# Patient Record
Sex: FEMALE | Race: WHITE | Employment: FULL TIME | URBAN - METROPOLITAN AREA
[De-identification: names, ages, dates, MRNs, and addresses within clinical notes are randomized per-mention and may not be internally consistent; named-entity substitution may affect disease eponyms.]

---

## 2017-03-29 PROBLEM — Z85.828 PERSONAL HISTORY OF SQUAMOUS CELL CARCINOMA OF SKIN: Status: ACTIVE | Noted: 2017-03-29

## 2020-07-13 PROBLEM — R51.9 RIGHT-SIDED HEADACHE: Status: ACTIVE | Noted: 2020-07-13

## 2020-08-04 PROBLEM — L57.0 ACTINIC KERATOSES: Status: ACTIVE | Noted: 2020-08-04

## 2021-06-12 ENCOUNTER — LAB ENCOUNTER (OUTPATIENT)
Dept: LAB | Facility: HOSPITAL | Age: 51
End: 2021-06-12
Attending: INTERNAL MEDICINE
Payer: COMMERCIAL

## 2021-06-12 DIAGNOSIS — Z01.818 PRE-OP TESTING: ICD-10-CM

## 2021-06-15 PROBLEM — Z12.11 SPECIAL SCREENING FOR MALIGNANT NEOPLASM OF COLON: Status: ACTIVE | Noted: 2021-06-15

## 2021-06-15 PROBLEM — D12.4 BENIGN NEOPLASM OF DESCENDING COLON: Status: ACTIVE | Noted: 2021-06-15

## 2021-06-15 PROBLEM — R10.13 ABDOMINAL PAIN, EPIGASTRIC: Status: ACTIVE | Noted: 2021-06-15

## 2021-06-15 PROBLEM — D12.0 BENIGN NEOPLASM OF CECUM: Status: ACTIVE | Noted: 2021-06-15

## 2021-06-15 PROBLEM — D12.2 BENIGN NEOPLASM OF ASCENDING COLON: Status: ACTIVE | Noted: 2021-06-15

## 2021-06-15 PROBLEM — D12.5 BENIGN NEOPLASM OF SIGMOID COLON: Status: ACTIVE | Noted: 2021-06-15

## 2021-10-26 PROBLEM — D22.5 BECKER'S NEVUS: Status: ACTIVE | Noted: 2021-10-26

## 2022-03-16 PROBLEM — N95.1 MENOPAUSAL VAGINAL DRYNESS: Status: ACTIVE | Noted: 2022-03-16

## 2022-06-01 ENCOUNTER — ORDER TRANSCRIPTION (OUTPATIENT)
Dept: ADMINISTRATIVE | Facility: HOSPITAL | Age: 52
End: 2022-06-01

## 2022-06-01 DIAGNOSIS — Z13.6 ENCOUNTER FOR SCREENING FOR CARDIOVASCULAR DISORDERS: Primary | ICD-10-CM

## 2022-06-06 ENCOUNTER — HOSPITAL ENCOUNTER (OUTPATIENT)
Dept: CT IMAGING | Facility: HOSPITAL | Age: 52
Discharge: HOME | End: 2022-06-06
Attending: FAMILY MEDICINE

## 2022-06-06 DIAGNOSIS — Z13.6 ENCOUNTER FOR SCREENING FOR CARDIOVASCULAR DISORDERS: ICD-10-CM

## 2024-04-15 ENCOUNTER — ANESTHESIA (OUTPATIENT)
Dept: SURGERY | Facility: HOSPITAL | Age: 54
End: 2024-04-15
Payer: COMMERCIAL

## 2024-04-15 ENCOUNTER — HOSPITAL ENCOUNTER (OUTPATIENT)
Facility: HOSPITAL | Age: 54
Setting detail: OBSERVATION
Discharge: HOME | End: 2024-04-15
Attending: SURGERY | Admitting: SURGERY
Payer: COMMERCIAL

## 2024-04-15 ENCOUNTER — ANESTHESIA EVENT (OUTPATIENT)
Dept: SURGERY | Facility: HOSPITAL | Age: 54
End: 2024-04-15
Payer: COMMERCIAL

## 2024-04-15 VITALS
DIASTOLIC BLOOD PRESSURE: 71 MMHG | OXYGEN SATURATION: 98 % | HEART RATE: 79 BPM | SYSTOLIC BLOOD PRESSURE: 122 MMHG | TEMPERATURE: 99 F | WEIGHT: 139.81 LBS | BODY MASS INDEX: 23.29 KG/M2 | HEIGHT: 65 IN | RESPIRATION RATE: 16 BRPM

## 2024-04-15 DIAGNOSIS — K35.80 APPENDICITIS, ACUTE: Primary | ICD-10-CM

## 2024-04-15 DIAGNOSIS — K35.80 APPENDICITIS, ACUTE: ICD-10-CM

## 2024-04-15 PROCEDURE — 0DTJ4ZZ RESECTION OF APPENDIX, PERCUTANEOUS ENDOSCOPIC APPROACH: ICD-10-PCS | Performed by: SURGERY

## 2024-04-15 PROCEDURE — 88304 TISSUE EXAM BY PATHOLOGIST: CPT | Performed by: SURGERY

## 2024-04-15 RX ORDER — KETOROLAC TROMETHAMINE 30 MG/ML
INJECTION, SOLUTION INTRAMUSCULAR; INTRAVENOUS AS NEEDED
Status: DISCONTINUED | OUTPATIENT
Start: 2024-04-15 | End: 2024-04-15 | Stop reason: SURG

## 2024-04-15 RX ORDER — ONDANSETRON 2 MG/ML
INJECTION INTRAMUSCULAR; INTRAVENOUS AS NEEDED
Status: DISCONTINUED | OUTPATIENT
Start: 2024-04-15 | End: 2024-04-15 | Stop reason: SURG

## 2024-04-15 RX ORDER — SODIUM CHLORIDE, SODIUM LACTATE, POTASSIUM CHLORIDE, CALCIUM CHLORIDE 600; 310; 30; 20 MG/100ML; MG/100ML; MG/100ML; MG/100ML
INJECTION, SOLUTION INTRAVENOUS CONTINUOUS
Status: DISCONTINUED | OUTPATIENT
Start: 2024-04-15 | End: 2024-04-15

## 2024-04-15 RX ORDER — ONDANSETRON 2 MG/ML
4 INJECTION INTRAMUSCULAR; INTRAVENOUS EVERY 6 HOURS PRN
Status: DISCONTINUED | OUTPATIENT
Start: 2024-04-15 | End: 2024-04-15

## 2024-04-15 RX ORDER — HYDROCODONE BITARTRATE AND ACETAMINOPHEN 5; 325 MG/1; MG/1
1 TABLET ORAL ONCE AS NEEDED
Status: COMPLETED | OUTPATIENT
Start: 2024-04-15 | End: 2024-04-15

## 2024-04-15 RX ORDER — METRONIDAZOLE 500 MG/100ML
INJECTION, SOLUTION INTRAVENOUS AS NEEDED
Status: DISCONTINUED | OUTPATIENT
Start: 2024-04-15 | End: 2024-04-15 | Stop reason: SURG

## 2024-04-15 RX ORDER — LEVOFLOXACIN 750 MG/1
750 TABLET, FILM COATED ORAL DAILY
Qty: 10 TABLET | Refills: 0 | Status: SHIPPED | OUTPATIENT
Start: 2024-04-15 | End: 2024-04-25

## 2024-04-15 RX ORDER — ACETAMINOPHEN 500 MG
1000 TABLET ORAL ONCE AS NEEDED
Status: COMPLETED | OUTPATIENT
Start: 2024-04-15 | End: 2024-04-15

## 2024-04-15 RX ORDER — ACETAMINOPHEN 500 MG
1000 TABLET ORAL ONCE
Status: DISCONTINUED | OUTPATIENT
Start: 2024-04-15 | End: 2024-04-15 | Stop reason: HOSPADM

## 2024-04-15 RX ORDER — DEXAMETHASONE SODIUM PHOSPHATE 4 MG/ML
VIAL (ML) INJECTION AS NEEDED
Status: DISCONTINUED | OUTPATIENT
Start: 2024-04-15 | End: 2024-04-15 | Stop reason: SURG

## 2024-04-15 RX ORDER — OXYCODONE HYDROCHLORIDE AND ACETAMINOPHEN 5; 325 MG/1; MG/1
1 TABLET ORAL EVERY 4 HOURS PRN
Qty: 30 TABLET | Refills: 0 | Status: SHIPPED | OUTPATIENT
Start: 2024-04-15 | End: 2024-04-25

## 2024-04-15 RX ORDER — HYDROMORPHONE HYDROCHLORIDE 1 MG/ML
0.2 INJECTION, SOLUTION INTRAMUSCULAR; INTRAVENOUS; SUBCUTANEOUS EVERY 5 MIN PRN
Status: DISCONTINUED | OUTPATIENT
Start: 2024-04-15 | End: 2024-04-15

## 2024-04-15 RX ORDER — HYDROMORPHONE HYDROCHLORIDE 1 MG/ML
0.4 INJECTION, SOLUTION INTRAMUSCULAR; INTRAVENOUS; SUBCUTANEOUS EVERY 5 MIN PRN
Status: DISCONTINUED | OUTPATIENT
Start: 2024-04-15 | End: 2024-04-15

## 2024-04-15 RX ORDER — ROCURONIUM BROMIDE 10 MG/ML
INJECTION, SOLUTION INTRAVENOUS AS NEEDED
Status: DISCONTINUED | OUTPATIENT
Start: 2024-04-15 | End: 2024-04-15 | Stop reason: SURG

## 2024-04-15 RX ORDER — LABETALOL HYDROCHLORIDE 5 MG/ML
5 INJECTION, SOLUTION INTRAVENOUS EVERY 5 MIN PRN
Status: DISCONTINUED | OUTPATIENT
Start: 2024-04-15 | End: 2024-04-15

## 2024-04-15 RX ORDER — SCOLOPAMINE TRANSDERMAL SYSTEM 1 MG/1
1 PATCH, EXTENDED RELEASE TRANSDERMAL ONCE
Status: DISCONTINUED | OUTPATIENT
Start: 2024-04-15 | End: 2024-04-15

## 2024-04-15 RX ORDER — CEFAZOLIN SODIUM/WATER 2 G/20 ML
2 SYRINGE (ML) INTRAVENOUS ONCE
Status: COMPLETED | OUTPATIENT
Start: 2024-04-15 | End: 2024-04-15

## 2024-04-15 RX ORDER — LIDOCAINE HYDROCHLORIDE 10 MG/ML
INJECTION, SOLUTION EPIDURAL; INFILTRATION; INTRACAUDAL; PERINEURAL AS NEEDED
Status: DISCONTINUED | OUTPATIENT
Start: 2024-04-15 | End: 2024-04-15 | Stop reason: SURG

## 2024-04-15 RX ORDER — CEFAZOLIN SODIUM/WATER 2 G/20 ML
SYRINGE (ML) INTRAVENOUS
Status: DISCONTINUED
Start: 2024-04-15 | End: 2024-04-15

## 2024-04-15 RX ORDER — ASCORBIC ACID 500 MG
500 TABLET ORAL DAILY
COMMUNITY

## 2024-04-15 RX ORDER — BUPIVACAINE HYDROCHLORIDE AND EPINEPHRINE 5; 5 MG/ML; UG/ML
INJECTION, SOLUTION EPIDURAL; INTRACAUDAL; PERINEURAL AS NEEDED
Status: DISCONTINUED | OUTPATIENT
Start: 2024-04-15 | End: 2024-04-15 | Stop reason: HOSPADM

## 2024-04-15 RX ORDER — PROCHLORPERAZINE EDISYLATE 5 MG/ML
5 INJECTION INTRAMUSCULAR; INTRAVENOUS EVERY 8 HOURS PRN
Status: DISCONTINUED | OUTPATIENT
Start: 2024-04-15 | End: 2024-04-15

## 2024-04-15 RX ORDER — HYDROCODONE BITARTRATE AND ACETAMINOPHEN 5; 325 MG/1; MG/1
2 TABLET ORAL ONCE AS NEEDED
Status: COMPLETED | OUTPATIENT
Start: 2024-04-15 | End: 2024-04-15

## 2024-04-15 RX ORDER — NALOXONE HYDROCHLORIDE 0.4 MG/ML
80 INJECTION, SOLUTION INTRAMUSCULAR; INTRAVENOUS; SUBCUTANEOUS AS NEEDED
Status: DISCONTINUED | OUTPATIENT
Start: 2024-04-15 | End: 2024-04-15

## 2024-04-15 RX ORDER — EPHEDRINE SULFATE 50 MG/ML
INJECTION INTRAVENOUS AS NEEDED
Status: DISCONTINUED | OUTPATIENT
Start: 2024-04-15 | End: 2024-04-15 | Stop reason: SURG

## 2024-04-15 RX ORDER — HYDROMORPHONE HYDROCHLORIDE 1 MG/ML
0.6 INJECTION, SOLUTION INTRAMUSCULAR; INTRAVENOUS; SUBCUTANEOUS EVERY 5 MIN PRN
Status: DISCONTINUED | OUTPATIENT
Start: 2024-04-15 | End: 2024-04-15

## 2024-04-15 RX ADMIN — LIDOCAINE HYDROCHLORIDE 50 MG: 10 INJECTION, SOLUTION EPIDURAL; INFILTRATION; INTRACAUDAL; PERINEURAL at 19:13:00

## 2024-04-15 RX ADMIN — EPHEDRINE SULFATE 10 MG: 50 INJECTION INTRAVENOUS at 19:30:00

## 2024-04-15 RX ADMIN — CEFAZOLIN SODIUM/WATER 2 G: 2 G/20 ML SYRINGE (ML) INTRAVENOUS at 19:16:00

## 2024-04-15 RX ADMIN — ROCURONIUM BROMIDE 50 MG: 10 INJECTION, SOLUTION INTRAVENOUS at 19:13:00

## 2024-04-15 RX ADMIN — ONDANSETRON 4 MG: 2 INJECTION INTRAMUSCULAR; INTRAVENOUS at 19:45:00

## 2024-04-15 RX ADMIN — DEXAMETHASONE SODIUM PHOSPHATE 8 MG: 4 MG/ML VIAL (ML) INJECTION at 19:20:00

## 2024-04-15 RX ADMIN — SODIUM CHLORIDE, SODIUM LACTATE, POTASSIUM CHLORIDE, CALCIUM CHLORIDE: 600; 310; 30; 20 INJECTION, SOLUTION INTRAVENOUS at 19:08:00

## 2024-04-15 RX ADMIN — SODIUM CHLORIDE, SODIUM LACTATE, POTASSIUM CHLORIDE, CALCIUM CHLORIDE: 600; 310; 30; 20 INJECTION, SOLUTION INTRAVENOUS at 19:41:00

## 2024-04-15 RX ADMIN — METRONIDAZOLE 500 MG: 500 INJECTION, SOLUTION INTRAVENOUS at 19:16:00

## 2024-04-15 RX ADMIN — KETOROLAC TROMETHAMINE 30 MG: 30 INJECTION, SOLUTION INTRAMUSCULAR; INTRAVENOUS at 19:45:00

## 2024-04-15 NOTE — ANESTHESIA PREPROCEDURE EVALUATION
PRE-OP EVALUATION    Patient Name: Estephania Nguyen    Admit Diagnosis: Appendicitis, acute [K35.80]    Pre-op Diagnosis: Appendicitis, acute [K35.80]    LAPAROSCOPIC APPENDECTOMY    Anesthesia Procedure: LAPAROSCOPIC APPENDECTOMY (Abdomen)    Surgeons and Role:     * Deni Naylor MD - Primary    Pre-op vitals reviewed.  Temp: 97.3 °F (36.3 °C)  Pulse: 87  Resp: 18  BP: 125/74  SpO2: 99 %  Body mass index is 23.26 kg/m².    Current medications reviewed.  Hospital Medications:   acetaminophen (Tylenol Extra Strength) tab 1,000 mg  1,000 mg Oral Once    scopolamine (Transderm-Scop) 1 MG/3DAYS patch 1 patch  1 patch Transdermal Once    lactated ringers infusion   Intravenous Continuous    ceFAZolin (Ancef) 2 g in 20mL IV syringe premix  2 g Intravenous Once    ceFAZolin (Ancef) 2 g/20mL IV syringe premix           Outpatient Medications:     Medications Prior to Admission   Medication Sig Dispense Refill Last Dose    Vitamin C 500 MG Oral Tab Take 1 tablet (500 mg total) by mouth daily.   4/14/2024 at 1830    DAILY MULTIVITAMIN OR None Entered   4/14/2024 at 1830       Allergies: Sulfa antibiotics, Azo-standard [phenazopyridine], Bactrim [sulfamethoxazole w/trimethoprim], Macrobid [nitrofurantoin], and Penicillins      Anesthesia Evaluation    Patient summary reviewed.    Anesthetic Complications  (-) history of anesthetic complications         GI/Hepatic/Renal  Comment: Indigestion  Problems with swallowing  Abdominal pain  Flatulence/gas pain/belching  Bloating  Diarrhea, unspecified  Constipation                                   Cardiovascular    Negative cardiovascular ROS.    Exercise tolerance: good     MET: >4                                           Endo/Other                           (+) arthritis       Pulmonary    Negative pulmonary ROS.             (-) recent URI          Neuro/Psych        (+) anxiety           (+) headaches                   Past Surgical History:   Procedure Laterality Date    Back  surgery       - FRANTZ PLACEMENT FOR SCOLIOSIS    Benign biopsy left      [facility]    Skin surgery  16    [facility] for SCC to Right Cheek - AB    Spine surgery procedure unlisted       Social History     Socioeconomic History    Marital status:    Tobacco Use    Smoking status: Former     Current packs/day: 0.00     Types: Cigarettes     Quit date: 10/30/1994     Years since quittin.4    Smokeless tobacco: Never    Tobacco comments:     SMOKED IN COLLEGE   Substance and Sexual Activity    Alcohol use: Yes     Comment: 1-2    Drug use: No     History   Drug Use No     Available pre-op labs reviewed.               Airway      Mallampati: I  Mouth opening: >3 FB  TM distance: > 6 cm  Neck ROM: full Cardiovascular    Cardiovascular exam normal.         Dental    Dentition appears grossly intact         Pulmonary    Pulmonary exam normal.                 Other findings              ASA: 1   Plan: general  NPO status verified and patient meets guidelines.    Post-procedure pain management plan discussed with surgeon and patient.    Comment: Plan for general anesthetic with endotracheal tube.  Risks and benefits pertaining to the proposed anesthetic and postoperative plan for pain, nausea/vomiting were discussed with patient.  Risks of general anesthetic include but not are not limited to adverse reactions related to medications administered, dental damage, and sore throat postoperatively.  Questions were answered and consent was signed.   Plan/risks discussed with: patient                Present on Admission:  **None**

## 2024-04-15 NOTE — H&P
History and physical    Estephania Nguyen Patient Status:  Inpatient    1970 MRN [id number]   Location [facility] OP [facility] Attending Deni Naylor MD   Hosp Day # 0 PCP Angela Alba MD       Chief Complaint:    Abdominal pain    History of Present Illness:    Estephania Nguyen is a a(n) 53 year old female.  Patient developed abdominal pain last evening.  This was diffuse and became more localized into the right lower quadrant.  She presented to the immediate care center where she underwent an evaluation and CT scan.  This revealed acute appendicitis.  No fever or chills.    Past MedicalHistory:    Past Medical History:    Abdominal pain    not in the intense pain in the lower left, but spotty pain    Anxiety    I'm not sure-COVID has brought on some panicky moments    Arthritis    lower back    Back pain    scoliosis related    Belching    Bloating    at times    Constipation    not really since the diverticulitis attack    Diarrhea, unspecified    often    Easy bruising    Fatigue    this is getting better    Flatulence/gas pain/belching    more gas and cramping since diverticulitis    Headache disorder    off and on    Heavy menses    heavy days 1-6 lighter on 7 and 8    Hemorrhoids    Indigestion    would that be the pain under ribcage?    Irregular bowel habits    Menses painful    lots of cramping at the beginning    Night sweats    menopause?    Problems with swallowing    thick almost mucus like-tough to swallow at times    Sleep disturbance    not sure if it has anything to do with this    Stress    who doesn't? :)    Wears glasses       Past Surgical History:    Past Surgical History:   Procedure Laterality Date    Back surgery       - FRANTZ PLACEMENT FOR SCOLIOSIS    Benign biopsy left      [facility]    Skin surgery  16    [facility] for SCC to Right Cheek - AB    Spine surgery procedure unlisted         Family History:    Family History   Problem Relation Age of Onset    Hypertension  Father     Lipids Father     Stroke Father     Diabetes Maternal Grandmother     Heart Disorder Maternal Grandmother     Cancer Paternal Grandmother         BREAST    Breast Cancer Paternal Grandmother 70        dx age 70's    Diabetes Brother     Ulcerative Colitis Mother        Social History:     reports that she quit smoking about 29 years ago. Her smoking use included cigarettes. She has never used smokeless tobacco. She reports current alcohol use. She reports that she does not use drugs.    Allergies:    Allergies   Allergen Reactions    Sulfa Antibiotics RASH    Azo-Standard [Phenazopyridine] NAUSEA AND VOMITING    Bactrim [Sulfamethoxazole W/Trimethoprim] RASH     urticaria    Macrobid [Nitrofurantoin] NAUSEA AND VOMITING    Penicillins RASH       Current Medications:      Current Facility-Administered Medications:     [Transfer Hold] acetaminophen (Tylenol Extra Strength) tab 1,000 mg, 1,000 mg, Oral, Once    scopolamine (Transderm-Scop) 1 MG/3DAYS patch 1 patch, 1 patch, Transdermal, Once    lactated ringers infusion, , Intravenous, Continuous    ceFAZolin (Ancef) 2 g in 20mL IV syringe premix, 2 g, Intravenous, Once    ceFAZolin (Ancef) 2 g/20mL IV syringe premix, , ,     lactated ringers infusion, , Intravenous, Continuous    lactated ringers IV bolus 500 mL, 500 mL, Intravenous, Once PRN    acetaminophen (Tylenol Extra Strength) tab 1,000 mg, 1,000 mg, Oral, Once PRN **OR** HYDROcodone-acetaminophen (Norco) 5-325 MG per tab 1 tablet, 1 tablet, Oral, Once PRN **OR** HYDROcodone-acetaminophen (Norco) 5-325 MG per tab 2 tablet, 2 tablet, Oral, Once PRN    atropine 0.1 MG/ML injection 0.5 mg, 0.5 mg, Intravenous, PRN    labetalol (Trandate) 5 mg/mL injection 5 mg, 5 mg, Intravenous, Q5 Min PRN    ondansetron (Zofran) 4 MG/2ML injection 4 mg, 4 mg, Intravenous, Q6H PRN    prochlorperazine (Compazine) 10 MG/2ML injection 5 mg, 5 mg, Intravenous, Q8H PRN    naloxone (Narcan) 0.4 MG/ML injection 80 mcg, 80  mcg, Intravenous, PRN    fentaNYL (Sublimaze) 50 mcg/mL injection 25 mcg, 25 mcg, Intravenous, Q5 Min PRN **OR** fentaNYL (Sublimaze) 50 mcg/mL injection 50 mcg, 50 mcg, Intravenous, Q5 Min PRN    HYDROmorphone (Dilaudid) 1 MG/ML injection 0.2 mg, 0.2 mg, Intravenous, Q5 Min PRN **OR** HYDROmorphone (Dilaudid) 1 MG/ML injection 0.4 mg, 0.4 mg, Intravenous, Q5 Min PRN **OR** HYDROmorphone (Dilaudid) 1 MG/ML injection 0.6 mg, 0.6 mg, Intravenous, Q5 Min PRN    Home Medications:    No current facility-administered medications on file prior to encounter.     Current Outpatient Medications on File Prior to Encounter   Medication Sig Dispense Refill    Vitamin C 500 MG Oral Tab Take 1 tablet (500 mg total) by mouth daily.      DAILY MULTIVITAMIN OR None Entered         Review of Systems:    10 point review performed pertinent positives and negatives per history of present illness.    Physical Exam:    Blood pressure 125/74, pulse 87, temperature 97.3 °F (36.3 °C), temperature source Temporal, resp. rate 18, height 5' 5\" (1.651 m), weight 139 lb 12.8 oz (63.4 kg), SpO2 99%, not currently breastfeeding.    GENERAL: well developed, well nourished female, in no apparent distress    SKIN: anicteric    HEENT:  normocephalic    NECK: supple, no JVD    RESPIRATORY: clear to auscultation    CARDIOVASCULAR: RRR    ABDOMEN: Soft with right lower quadrant tenderness on exam    LYMPHATIC: no lymphadenopathy    EXTREMITIES: no cyanosis or edema    .    Laboratory Data:  No results for input(s): \"WBC\", \"HGB\", \"MCV\", \"PLT\", \"BAND\", \"INR\" in the last 168 hours.    Invalid input(s): \"LYM#\", \"MONO#\", \"BASOS#\", \"EOSIN#\"    No results for input(s): \"NA\", \"K\", \"CL\", \"CO2\", \"BUN\", \"CREATSERUM\", \"GLU\", \"CA\", \"CAION\", \"MG\", \"PHOS\" in the last 168 hours.    No results for input(s): \"ALT\", \"AST\", \"ALB\", \"AMYLASE\", \"LIPASE\", \"LDH\" in the last 168 hours.    Invalid input(s): \"ALPHOS\", \"TBIL\", \"DBIL\", \"TPROT\"    No results for input(s): \"TROP\" in the  last 168 hours.          Radiology:   No results found.      Problem List:    Patient Active Problem List   Diagnosis    DDD (degenerative disc disease), lumbosacral    Scoliosis    Personal history of squamous cell carcinoma of skin    Right-sided headache    Actinic keratoses    Abdominal pain, epigastric    Special screening for malignant neoplasm of colon    Benign neoplasm of cecum    Benign neoplasm of ascending colon    Benign neoplasm of descending colon    Benign neoplasm of sigmoid colon    Jung's nevus    Menopausal vaginal dryness       Impression:    Acute appendicitis    Plan:    Laparoscopic appendectomy possible open.  Risk, benefits and alternatives were discussed in detail.  Patient voiced understanding and willing to proceed        Deni Naylor MD  4/15/2024

## 2024-04-16 NOTE — ANESTHESIA PROCEDURE NOTES
Airway  Date/Time: 4/15/2024 7:14 PM  Urgency: elective    Airway not difficult    General Information and Staff    Patient location during procedure: OR  Anesthesiologist: Shanae Patel MD  Performed: anesthesiologist   Performed by: Shanae Patel MD  Authorized by: Shanae Patel MD      Indications and Patient Condition  Indications for airway management: anesthesia  Spontaneous Ventilation: absent  Sedation level: deep  Preoxygenated: yes  Patient position: sniffing  Mask difficulty assessment: 1 - vent by mask    Final Airway Details  Final airway type: endotracheal airway      Successful airway: ETT  Cuffed: yes   Successful intubation technique: direct laryngoscopy  Endotracheal tube insertion site: oral  Blade: Reji  Blade size: #3  ETT size (mm): 7.0    Cormack-Lehane Classification: grade I - full view of glottis  Placement verified by: capnometry   Cuff volume (mL): 6  Measured from: lips  Number of attempts at approach: 1

## 2024-04-16 NOTE — DISCHARGE INSTRUCTIONS
Revised 05/17/05, 05/27/05, 08/18/10 rigo  Home Care Instructions  LAPAROSCOPIC SURGERY    Dr DONITA Naylor.    MEDICATIONS   You should anticipate moderate to severe pain for the first few days.   Your surgeon has prescribed for you a pain medication., usually Norco. Take the pain  medication as prescribed. You may use ibuprofen( Motrin ) 600 mg 3 times a day with the Norco or by itself if needed.   If you experience severe nausea or vomiting stop the pain medication and call our office.    DIET   Your diet should be as you tolerate. Eat light foods the first 24 hours.   Do not drink alcohol while taking the pain medication    ACTIVITY   You should not drive for the first 3 to 5 days or if you are still requiring prescription pain medication.   No lifting greater than 10 to 15 pounds for 3 weeks   Avoid strenuous activity such as running and physical workouts for 3 weeks. Normal daily activities are fine, such as climbing stairs   You may shower after 24 hours.  Your incisions have glue directly on the surface.  These can get wet but should not be submerged in a bath.   You may return to work as instructed by your surgeon.    CARE OF SURGICAL SITE   You have glue directly on the surface of the incisions.  The glue will separate from the incision with time.   Pain, colorful bruising and slight swelling at the incision sites is usually normal   If you experience fever, chills, inability to urinate, nausea with vomiting, severe diarrhea  or severe, cramping abdominal pain please contact your surgeon    APPOINTMENT   Call the office when you arrive home after surgery and make an appointment to see your surgeon in one week.   Should you develop any problems prior to your scheduled appointment, do not hesitate  to call the office.    [facility]  [phone]     You received a drug called Toradol which is an Anti Inflammatory at: 7:45 pm     Do not take any Anti Inflammatory like Motrin, Advil, Aleve or Ibuprofen  until after: 1:45 am   Please report any suspected allergic reactions or bleeding issues to your doctor

## 2024-04-16 NOTE — OPERATIVE REPORT
Report of Operation    Estephania Nguyen Patient Status:  Inpatient    1970 MRN [id number]   [facility] SURGERY Attending Deni Naylor MD   Hosp Day # 0 PCP Angela Alba MD         4/15/2024    Estephania M Wendy    PRE-OPERATIVE DIAGNOSIS:                      Acute Appendicitis    POST-OPERATIVE DIAGNOSIS:                    Acute Appendicitis    PROCEDURE:                                               Laparoscopic Appendectomy    SURGEON:                                                    Deni Naylor MD    ASSISTANT:                                                   none    ANESTHESIA:                                                General    EBL:                                                               20cc          PROCEDURE:      The patient was taken to the operating suite  after informed consent and proper identification, placed in the supine position on the operating room table and administered general anesthetic. The patient's abdomen was prepped and draped in the usual sterile fashion. An infraumbilical incision was performed with a #11 scalpel. A Veress needle was introduced into the peritoneal cavity. Pneumoperitoneum was created. An 11-mm trocar was introduced through the umbilical incision and laparoscope was introduced. A 5-mm trocar was placed in the suprapubic region and a 12-mm trocar was placed in the left lower abdomen. Appendix showed evidence of  appendicitis. Window was created between the base of the appendix and the mesoappendix. An Endo ONEYDA stapler was fired across the base of the appendix. The mesoappendix was taken down with the LigaSure device. This appendix was then placed in an Endo Catch bag and delivered through the 12-mm port site. All ports were introduced into the abdominal cavity. Irrigation of the abdomen was performed until clear. Hemostasis was achieved. Pneumoperitoneum was decompressed, and all ports were removed. The umbilical fascia and 12-mm  port fascia were closed with 0 Vicryl. All skin incisions were closed with 4-0 Vicryl in a subcuticular fashion.  Dermabond was then applied directly to the incision surface.  Patient tolerated the procedure well.    Deni Naylor MD  4/15/2024

## 2024-04-16 NOTE — ANESTHESIA POSTPROCEDURE EVALUATION
[place]    Estephania Nguyen Patient Status:  Inpatient   Age/Gender 53 year old female MRN [id number]   Location [facility] POST ANESTHESIA CARE UNIT Attending Deni Naylor MD   Hosp Day # 0 PCP Angela Alba MD       Anesthesia Post-op Note    LAPAROSCOPIC APPENDECTOMY    Procedure Summary       Date: 04/15/24 Room / Location:  MAIN OR 09 / [facility] MAIN OR    Anesthesia Start: 1908 Anesthesia Stop: 2019    Procedure: LAPAROSCOPIC APPENDECTOMY (Abdomen) Diagnosis:       Appendicitis, acute      (Appendicitis, acute [K35.80])    Surgeons: Deni Naylor MD Anesthesiologist: Shanae Patel MD    Anesthesia Type: general ASA Status: 1            Anesthesia Type: general    Vitals Value Taken Time   /72 04/15/24 2015   Temp 98.4 °F (36.9 °C) 04/15/24 2015   Pulse 103 04/15/24 2019   Resp 16 04/15/24 2019   SpO2 99 % 04/15/24 2019   Vitals shown include unfiled device data.    Patient Location: PACU    Anesthesia Type: general    Airway Patency: patent    Postop Pain Control: adequate    Mental Status: mildly sedated but able to meaningfully participate in the post-anesthesia evaluation    Nausea/Vomiting: none    Cardiopulmonary/Hydration status: stable euvolemic    Complications: no apparent anesthesia related complications    Postop vital signs: stable    Dental Exam: Unchanged from Preop    Patient to be discharged from PACU when criteria met.

## 2024-04-19 NOTE — PAYOR COMM NOTE
--------------  ADMISSION REVIEW     Payor: ANGELO AVERY  Subscriber #:  [id number]  Authorization Number: [id number]    Admit date: 4/15/24  Admit time:  5:25 PM       REVIEW DOCUMENTATION:      4/15 H&P  Chief Complaint:     Abdominal pain     History of Present Illness:     Estephania Nguyen is a a(n) 53 year old female.  Patient developed abdominal pain last evening.  This was diffuse and became more localized into the right lower quadrant.  She presented to the [facility] care center where she underwent an evaluation and CT scan.  This revealed acute appendicitis.  No fever or chills    Impression:     Acute appendicitis     Plan:     Laparoscopic appendectomy possible open.  Risk, benefits and alternatives were discussed in detail.  Patient voiced understanding and willing to proceed         4/15 OP Report  4/15/2024     Estephania Nguyen     PRE-OPERATIVE DIAGNOSIS:                       Acute Appendicitis     POST-OPERATIVE DIAGNOSIS:                     Acute Appendicitis     PROCEDURE:                                                Laparoscopic Appendectomy     SURGEON:                                                    General     EBL:                                                                20cc          Vitals (last day) before discharge       Date/Time Temp Pulse Resp BP SpO2 Weight O2 Device O2 Flow Rate (L/min) [facility]    04/15/24 2130 -- 79 -- -- 98 % -- None (Room air) --     04/15/24 2115 -- 82 -- 122/71 97 % -- -- --     04/15/24 2100 -- 83 -- 126/67 100 % -- -- --     04/15/24 2047 99.1 °F (37.3 °C) 84 16 113/75 99 % -- None (Room air) --     04/15/24 2040 98.2 °F (36.8 °C) 86 16 117/63 98 % -- None (Room air) --     04/15/24 2030 -- 99 14 127/64 99 % -- -- --     04/15/24 2025 -- 98 14 -- 100 % -- -- --     04/15/24 2020 -- 101 16 119/70 98 % -- -- --     04/15/24 2015 98.4 °F (36.9 °C) 100 18 129/72 100 % -- None (Room air) --     04/15/24 1745 97.3 °F (36.3 °C) 87 18 125/74  99 % 139 lb 12.8 oz None (Room air) -- JH            --------------  DISCHARGE REVIEW    Payor: [facility] PPO  Subscriber #:  [id number]  Authorization Number: [id number]    Admit date: 4/15/24  Admit time:   5:25 PM  Discharge Date: 4/15/2024  9:42 PM     Admitting Physician: Deni Naylor MD  Attending Physician:  No att. providers found  Primary Care Physician: Angela Alba MD

## (undated) DEVICE — TRAY CATH 16FR F INCL BARDX IC COMPLT CARE

## (undated) DEVICE — SOLUTION IRRIG 1000ML 0.9% NACL USP BTL

## (undated) DEVICE — HUNTER GASPER TIP, DISPOSABLE: Brand: RENEW

## (undated) DEVICE — UNDYED BRAIDED (POLYGLACTIN 910), SYNTHETIC ABSORBABLE SUTURE: Brand: COATED VICRYL

## (undated) DEVICE — POUCH SPECIMEN WIRE 6X3 250ML

## (undated) DEVICE — GLOVE SUR 7 SENSICARE PI PIP CRM PWD F

## (undated) DEVICE — LAP CHOLE/APPY CDS-LF: Brand: MEDLINE INDUSTRIES, INC.

## (undated) DEVICE — TROCAR: Brand: KII SHIELDED BLADED ACCESS SYSTEM

## (undated) DEVICE — SLEEVE COMPR MD KNEE LEN SGL USE KENDALL SCD

## (undated) DEVICE — MARYLAND JAW LAPAROSCOPIC SEALER/DIVIDER COATED: Brand: LIGASURE

## (undated) DEVICE — ENDOPATH ULTRA VERESS INSUFFLATION NEEDLES WITH LUER LOCK CONNECTORS: Brand: ENDOPATH

## (undated) DEVICE — ADHESIVE SKIN TOP FOR WND CLSR DERMBND ADV

## (undated) DEVICE — SUT COAT VCRL + 0 54IN ABSRB UD ANTIBACT

## (undated) DEVICE — ARTICULATION RELOAD WITH TRI-STAPLE TECHNOLOGY: Brand: ENDO GIA

## (undated) DEVICE — 40580 - THE PINK PAD - ADVANCED TRENDELENBURG POSITIONING KIT: Brand: 40580 - THE PINK PAD - ADVANCED TRENDELENBURG POSITIONING KIT

## (undated) DEVICE — SUT COAT VCRL+ 0 27IN UR-6 ABSRB VLT ANTIBACT

## (undated) DEVICE — TRADITIONAL MARYLAND DISSECTOR TIP, DISPOSABLE: Brand: RENEW

## (undated) DEVICE — COVER LT HNDL RIG FOR SUR CAM DISP